# Patient Record
Sex: MALE | Race: ASIAN | NOT HISPANIC OR LATINO
[De-identification: names, ages, dates, MRNs, and addresses within clinical notes are randomized per-mention and may not be internally consistent; named-entity substitution may affect disease eponyms.]

---

## 2020-10-31 ENCOUNTER — TRANSCRIPTION ENCOUNTER (OUTPATIENT)
Age: 25
End: 2020-10-31

## 2021-01-15 ENCOUNTER — TRANSCRIPTION ENCOUNTER (OUTPATIENT)
Age: 26
End: 2021-01-15

## 2021-08-31 ENCOUNTER — TRANSCRIPTION ENCOUNTER (OUTPATIENT)
Age: 26
End: 2021-08-31

## 2021-09-09 ENCOUNTER — TRANSCRIPTION ENCOUNTER (OUTPATIENT)
Age: 26
End: 2021-09-09

## 2022-05-12 ENCOUNTER — NON-APPOINTMENT (OUTPATIENT)
Age: 27
End: 2022-05-12

## 2022-11-06 ENCOUNTER — NON-APPOINTMENT (OUTPATIENT)
Age: 27
End: 2022-11-06

## 2022-11-07 PROBLEM — Z00.00 ENCOUNTER FOR PREVENTIVE HEALTH EXAMINATION: Status: ACTIVE | Noted: 2022-11-07

## 2022-11-09 ENCOUNTER — NON-APPOINTMENT (OUTPATIENT)
Age: 27
End: 2022-11-09

## 2022-11-09 ENCOUNTER — APPOINTMENT (OUTPATIENT)
Dept: ORTHOPEDIC SURGERY | Facility: CLINIC | Age: 27
End: 2022-11-09

## 2022-11-09 VITALS — BODY MASS INDEX: 28.93 KG/M2 | HEIGHT: 66 IN | WEIGHT: 180 LBS

## 2022-11-09 PROCEDURE — 99204 OFFICE O/P NEW MOD 45 MIN: CPT

## 2022-11-09 NOTE — HISTORY OF PRESENT ILLNESS
[de-identified] : Patient is a 27-year-old male with history of left knee injury approximately 13 years ago when he fell on a metal plate.  He sustained a laceration to the anteromedial aspect of his knee which was never treated and healed on its own.  Years later he started developing occasional locking sensations in the knee with mild swelling and pain during these locking episodes.  He had an MRI at that time and was told he had a minor ACL injury.  He briefly did physical therapy at that time and the locking resolved.  Over the past 3 months the locking has returned more frequently and associated with pain and mild swelling.  The pain is typically on the superolateral aspect of the knee.  Does not involve the medial or lateral joint lines.  He is hesitant to use his knee due to fear of these locking episodes.  He denies any buckling, clicking, loss of motion.  He has been exercising at a gym intermittently over the past 6 months although it is becoming more difficult.  No formal physical therapy.  He does wear a soft knee sleeve when doing exercises.  Does not take any medications for this.

## 2022-11-09 NOTE — DISCUSSION/SUMMARY
[de-identified] : Patient is a 27-year-old male with large loose body within the left knee joint due to an old injury.  The MRI does not show any knee effusion or edema at the donor site which is likely the posterolateral aspect of the lateral femoral condyle.  Reviewed diagnosis and management with him.  At this time he is having significant mechanical symptoms that are interfering with his daily life.  We discussed continued nonoperative management which would involve weight reduction, activity modification with low impact exercise, PRN use of acetaminophen or anti-inflammatory medication if tolerated, glucosamine/chondroitin supplements, and physical therapy. Further treatments can include corticosteroid injection and the use of hyaluronic acid injections.  Definitive treatment would involve left knee arthroscopy with removal of the loose bodies, possible open removal.  The risks, benefits, and alternatives were reviewed with him and all of his questions were answered, and in the presence of his parent.  This would involve some postoperative physical therapy as well which was reviewed with him.  He would like to proceed with surgery including arthroscopic and possible open removal of the loose fragment.  Risks would include but not limited to the following: Pain, bleeding, infection, damage to nearby neurovascular structures, need for additional surgery, knee stiffness, anesthetic risks.  He understands this and would like to proceed.  Surgery will be scheduled at a mutually convenient time.

## 2022-11-09 NOTE — PHYSICAL EXAM
[de-identified] : General: No acute distress\par Mental: Alert and oriented x3\par Eyes: Conjunctivitis not seen\par Chest: Symmetric chest rise, no audible wheezing\par Skin: Bilateral lower extremities absent from rashes and ulcers\par Abdomen: No distention\par \par Right knee:\par Skin: Clean, dry, intact \par Inspection: No obvious malalignment, no masses, no swelling, no effusion. \par Tenderness: no MJLT. no LJLT. No tenderness over the medial and lateral patella facets. No ttp medial/lateral epicondyle, patella tendon, tibial tubercle, pes anserinus, or proximal fibula. \par ROM: 0 to 135\par Stability: Stable to varus and valgus, negative lachman. Negative anterior/posterior drawer. \par Additional tests: Negative McMurrays test, negative Steinmann, Negative patellar grind test.  \par Strength: 5/5 Q/H/TA/GS/EHL, no atrophy \par Neuro: Sensation intact to light touch throughout in dp/sp/tib/ines/saph nerve distributions\par Pulses: 2+ DP/PT pulses.\par \par Left knee:\par Skin: Clean, dry, intact \par Inspection: No obvious malalignment, no masses, no swelling, trace effusion. \par Tenderness: no MJLT. no LJLT. No tenderness over the medial and lateral patella facets. No ttp medial/lateral epicondyle, patella tendon, tibial tubercle, pes anserinus, or proximal fibula. \par ROM: 0 to 140° with apprehension and stiffness\par Stability: Stable to varus and valgus, negative lachman. Negative anterior/posterior drawer. \par Additional tests: Pain with McMurrays test, negative Steinmann, Negative patellar grind test.  \par Strength: 5/5 Q/H/TA/GS/EHL, no atrophy \par Neuro: Sensation intact to light touch throughout in dp/sp/tib/ines/saph nerve distributions\par Pulses: 2+ DP/PT pulses.\par  [de-identified] : X-rays of the left knee from [facility] radiology dated 10/26/2022 show preserved joint spaces both medially and laterally.  There is a large loose body in the suprapatellar recess measuring approximately 2.5 cm.\par MRI of the left knee dated 10/26/2022 also shows the loose body in the suprapatellar recess with an additional 5 mm loose body along the anterior intrameniscal ligament.  There is a chondral defect over the posterior lateral aspect of the lateral femoral condyle.  No subchondral or bony edema at that location.

## 2022-11-10 ENCOUNTER — NON-APPOINTMENT (OUTPATIENT)
Age: 27
End: 2022-11-10

## 2022-11-17 ENCOUNTER — APPOINTMENT (OUTPATIENT)
Dept: ORTHOPEDIC SURGERY | Facility: CLINIC | Age: 27
End: 2022-11-17

## 2022-11-17 VITALS
BODY MASS INDEX: 28.93 KG/M2 | HEIGHT: 66 IN | WEIGHT: 180 LBS | DIASTOLIC BLOOD PRESSURE: 82 MMHG | SYSTOLIC BLOOD PRESSURE: 129 MMHG

## 2022-11-17 DIAGNOSIS — M22.8X9 OTHER DISORDERS OF PATELLA, UNSPECIFIED KNEE: ICD-10-CM

## 2022-11-17 DIAGNOSIS — M23.40 LOOSE BODY IN KNEE, UNSPECIFIED KNEE: ICD-10-CM

## 2022-11-17 PROCEDURE — 99213 OFFICE O/P EST LOW 20 MIN: CPT

## 2022-11-19 PROBLEM — M22.8X9 PATELLAR MALTRACKING: Status: ACTIVE | Noted: 2022-11-19

## 2022-11-19 PROBLEM — M23.40 LOOSE BODY IN KNEE: Status: ACTIVE | Noted: 2022-11-09

## 2022-11-19 NOTE — DISCUSSION/SUMMARY
[de-identified] : Mr. Burks is a 27-year-old male who presents to the office for evaluation of his left knee pain and locking.  Patient has been experiencing left knee pain and locking for about 10 to 12 years.  He states that his knee does not have significant pain when there is no locking, however the locking has occurred more frequently over the last few months.  It is now occurring about every month. XRays showed a large loose body in the suprapatellar pouch.  MRI showed a full-thickness chondral defect over the lateral femoral condyle, trochlear dysplasia, and a large loose body in the suprapatellar pouch.  Discussed with the patient examination and imaging findings.  Discussed with the patient that he has a large loose body in the knee, which may be causing his locking episodes.  Discussed that management of the loose body may consist of left knee arthroscopy and loose body removal.  However, discussed that due to patient's young age and focal cartilage defect on the lateral femoral condyle, may be beneficial for further discussion with a  to determine if a cartilage procedure is warranted.  Also discussed that patient's patellar maltracking may contribute to his locking episodes and that it may be beneficial to discuss further with a sport specialist.  Patient was given a referral to , Dr. Ruiz or Dr. Uriostegui.  Patient understanding and in agreement with the plan.  All questions answered.\par \par Plan:\par - Follow up with a  for evaluation of left knee loose body, focal cartilage defect, and trochlear dysplasia

## 2022-11-19 NOTE — PHYSICAL EXAM
[de-identified] : Constitutional:  27 year old male, alert and oriented, cooperative, in no acute distress.\par \par HEENT \par NC/AT.  Appearance: symmetric\par \par Neck/Back\par Straight without deformity or instability.  Good ROM.\par \par Chest/Respiratory \par Respiratory effort: no intercostal retractions or use of accessory muscles. Nonlabored Breathing\par \par Skin \par On inspection, warm and dry without rashes or lesions.\par \par Mental Status: \par Judgment, insight: intact\par Orientation: oriented to time, place, and person\par \par Neurological:\par Sensory and Motor are grossly intact throughout\par \par Left Knee\par \par Inspection:\par     Skin intact, no rashes or lesions\par     No Effusion\par     Non-tender to palpation over tibial tubercle, patella, medial and lateral joint line, and pes insertion.\par \par Range of Motion:\par 	Extension - 0 degrees\par 	Flexion - 120 degrees\par 	Extensor lag: None\par \par Stability:\par      Demonstrates no Varus or Valgus instability\par      Negative Anterior or Posterior drawer.\par      Negative Lachman's\par \par Patella: stable \par \par Neurologic Exam\par     Motor intact including 5/5 Extensor Hallucis Longus, 5/5 Flexor Hallucis Longus, 5/5 Tibialis Anterior and 5/5 Gastrocnemius\par     Sensation Intact to Light Touch including Saphenous, Sural, Superficial Peroneal, Deep Peroneal, Tibial nerve distributions\par \par Vascular Exam\par     Foot is warm and well perfused with 2+ Dorsalis Pedis Pulse \par \par No pain with range of motion of the bilateral hips or right knee. No lumbar paraspinal muscle tenderness.  [de-identified] : XRay: [facility]: 10/26/22\par \par Findings:\par There is no acute fracture or dislocation.  The joint spaces are well-preserved.  There is slight articular cortical irregularity along the posterior weightbearing portion of the lateral femoral condyle.  There is no lytic or blastic osseous lesion.  There is no significant joint effusion.  There is a 2.5 cm discoid shaped loose body in the suprapatellar recess.  Patella alto is noted.\par \par Impression: Large loose body in the suprapatellar recess\par \par MRI: [facility]: 10/26/22\par \par Impression:\par 1. Chronic full-thickness chondral defect over the posterolateral weightbearing portion of the lateral femoral condyle with large displaced osteochondral fragment in the lateral suprapatellar recess\par 2. Additional 5 mm noncalcified low signal intensity loose body in the anteromedial intercondylar notch\par 3. Irregular/nodular focus of synovitis partially surrounding [place] meniscal femoral ligament in the medial aspect of the intercondylar notch\par 4. No meniscal or ligament tear or significant joint effusion identified\par 5. Patella sarina with femoral trochlear dysplasia

## 2022-11-19 NOTE — HISTORY OF PRESENT ILLNESS
[de-identified] : Mr. Burks is a 27-year-old male presents to the office for evaluation of his left knee pain.  Patient has a longstanding history of left knee pain for about 10 to 12 years.  He states that he has occasional knee locking and feels like something gets stuck in his knee joint.  Patient would then move around and dislodge it.  This would happen about every 3 to 6 months for the last few years.  However, now it is occurring more frequently, about every month.  He states that the locking feels as if something is caught in the knee, but also that the kneecap feels out of place during these episodes.  He has never noticed a deformity during the locking episodes.  Locking episodes have not occurred while patient has worn a compression sleeve on his knee.  Patient states that he feels pain when working out his legs.  Patient states that his knee last locked about 1.5 months ago and he had significant pain with flexion of the knee at the time.  He was not able to walk much at the time but then put his knee in a compression sleeve, which allowed him to walk.  Patient does not feel significant pain when the knee is not locking.  However, the locking is not predictable and can happen with different activities or motions.  Patient reports no recent injuries.  He states that the only injury he can think of occurred while in middle school when he struck his knee on a metal piece on the bus.  His first knee locking episode was about 10 to 12 years ago.

## 2022-11-19 NOTE — REVIEW OF SYSTEMS
[Joint Pain] : joint pain [Negative] : Endocrine [Joint Stiffness] : no joint stiffness [Joint Swelling] : no joint swelling [Fever] : no fever [Chills] : no chills [de-identified] : No fevers/chills [de-identified] : No numbness/tingling

## 2023-01-06 ENCOUNTER — NON-APPOINTMENT (OUTPATIENT)
Age: 28
End: 2023-01-06

## 2023-04-09 ENCOUNTER — NON-APPOINTMENT (OUTPATIENT)
Age: 28
End: 2023-04-09

## 2024-09-23 ENCOUNTER — APPOINTMENT (OUTPATIENT)
Dept: DERMATOLOGY | Facility: CLINIC | Age: 29
End: 2024-09-23
Payer: COMMERCIAL

## 2024-09-23 VITALS — WEIGHT: 177 LBS | BODY MASS INDEX: 28.57 KG/M2

## 2024-09-23 DIAGNOSIS — L81.9 DISORDER OF PIGMENTATION, UNSPECIFIED: ICD-10-CM

## 2024-09-23 PROCEDURE — 99204 OFFICE O/P NEW MOD 45 MIN: CPT

## 2024-09-23 RX ORDER — TACROLIMUS 1 MG/G
0.1 OINTMENT TOPICAL
Qty: 1 | Refills: 3 | Status: ACTIVE | COMMUNITY
Start: 2024-09-23 | End: 1900-01-01

## 2024-09-23 NOTE — HISTORY OF PRESENT ILLNESS
[FreeTextEntry1] : NPV dark spots [de-identified] : Mr. 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

## 2024-09-23 NOTE — PHYSICAL EXAM
[Alert] : alert [Oriented x 3] : ~L oriented x 3 [Declined] : declined [FreeTextEntry3] : Focused exam only (see below) per patient request: - ill defined Hyperpigmented macules on lower mucosal lip, faint hyperpigmented patches oral commissures intraoral mucosa clear

## 2024-10-28 ENCOUNTER — APPOINTMENT (OUTPATIENT)
Dept: DERMATOLOGY | Facility: CLINIC | Age: 29
End: 2024-10-28

## 2025-01-30 ENCOUNTER — RX RENEWAL (OUTPATIENT)
Age: 30
End: 2025-01-30